# Patient Record
Sex: MALE | Race: BLACK OR AFRICAN AMERICAN | Employment: UNEMPLOYED | URBAN - METROPOLITAN AREA
[De-identification: names, ages, dates, MRNs, and addresses within clinical notes are randomized per-mention and may not be internally consistent; named-entity substitution may affect disease eponyms.]

---

## 2018-01-17 ENCOUNTER — HOSPITAL ENCOUNTER (EMERGENCY)
Age: 38
Discharge: HOME | End: 2018-01-17
Attending: EMERGENCY MEDICINE

## 2018-01-17 VITALS
RESPIRATION RATE: 16 BRPM | HEIGHT: 67 IN | HEART RATE: 77 BPM | OXYGEN SATURATION: 100 % | SYSTOLIC BLOOD PRESSURE: 149 MMHG | DIASTOLIC BLOOD PRESSURE: 93 MMHG | BODY MASS INDEX: 25.11 KG/M2 | TEMPERATURE: 97.2 F | WEIGHT: 160 LBS

## 2018-01-17 DIAGNOSIS — K08.89 PAIN, DENTAL: Primary | ICD-10-CM

## 2018-01-17 PROCEDURE — 99282 EMERGENCY DEPT VISIT SF MDM: CPT

## 2018-01-17 PROCEDURE — 6370000000 HC RX 637 (ALT 250 FOR IP): Performed by: EMERGENCY MEDICINE

## 2018-01-17 RX ORDER — PENICILLIN V POTASSIUM 500 MG/1
500 TABLET ORAL 4 TIMES DAILY
Qty: 28 TABLET | Refills: 0 | Status: SHIPPED | OUTPATIENT
Start: 2018-01-17 | End: 2018-01-24

## 2018-01-17 RX ORDER — TRAMADOL HYDROCHLORIDE 50 MG/1
50 TABLET ORAL ONCE
Status: COMPLETED | OUTPATIENT
Start: 2018-01-17 | End: 2018-01-17

## 2018-01-17 RX ORDER — PENICILLIN V POTASSIUM 250 MG/1
500 TABLET ORAL ONCE
Status: COMPLETED | OUTPATIENT
Start: 2018-01-17 | End: 2018-01-17

## 2018-01-17 RX ADMIN — PENICILLIN V POTASSIUM 500 MG: 250 TABLET ORAL at 08:27

## 2018-01-17 RX ADMIN — TRAMADOL HYDROCHLORIDE 50 MG: 50 TABLET, FILM COATED ORAL at 08:27

## 2018-01-17 ASSESSMENT — ENCOUNTER SYMPTOMS
CONSTIPATION: 0
TROUBLE SWALLOWING: 0
NAUSEA: 0
COUGH: 0
ABDOMINAL DISTENTION: 0
RHINORRHEA: 0
DIARRHEA: 0
SORE THROAT: 0
SHORTNESS OF BREATH: 0
VOMITING: 0
WHEEZING: 0

## 2018-01-17 ASSESSMENT — PAIN DESCRIPTION - LOCATION: LOCATION: TEETH

## 2018-01-17 ASSESSMENT — PAIN DESCRIPTION - PAIN TYPE: TYPE: CHRONIC PAIN

## 2018-01-17 ASSESSMENT — PAIN SCALES - GENERAL: PAINLEVEL_OUTOF10: 10

## 2018-01-17 ASSESSMENT — PAIN DESCRIPTION - FREQUENCY: FREQUENCY: CONTINUOUS

## 2018-01-17 ASSESSMENT — PAIN DESCRIPTION - DESCRIPTORS: DESCRIPTORS: THROBBING

## 2018-01-17 ASSESSMENT — PAIN DESCRIPTION - ORIENTATION: ORIENTATION: LEFT;LOWER

## 2018-01-17 NOTE — ED NOTES
[facility]  [address]  Phone: [phone]  Fax: [phone]    Patient Referral Fax     To:  Patient Referral Coordinator Fax:  [phone]  Referral from:  Opal Sommer [address]  30 y.o.      [phone] (home)      Additional Notes: Friday January 19, 2018 at 9:00am     Signature: Kyra Bose Date: 1/17/18         [facility]  01/17/18 7247

## 2019-12-01 ENCOUNTER — HOSPITAL ENCOUNTER (EMERGENCY)
Age: 39
Discharge: HOME | End: 2019-12-01
Attending: EMERGENCY MEDICINE

## 2019-12-01 DIAGNOSIS — R00.0 TACHYCARDIA: Primary | ICD-10-CM

## 2019-12-01 LAB
ABSOLUTE EOS #: 0.05 K/UL (ref 0–0.44)
ABSOLUTE IMMATURE GRANULOCYTE: 0.05 K/UL (ref 0–0.3)
ABSOLUTE LYMPH #: 1.77 K/UL (ref 1.1–3.7)
ABSOLUTE MONO #: 1.05 K/UL (ref 0.1–1.2)
ANION GAP SERPL CALCULATED.3IONS-SCNC: 15 MMOL/L (ref 9–17)
BASOPHILS # BLD: 0 % (ref 0–2)
BASOPHILS ABSOLUTE: <0.03 K/UL (ref 0–0.2)
BUN BLDV-MCNC: 11 MG/DL (ref 6–20)
BUN/CREAT BLD: NORMAL (ref 9–20)
CALCIUM SERPL-MCNC: 9.4 MG/DL (ref 8.6–10.4)
CHLORIDE BLD-SCNC: 105 MMOL/L (ref 98–107)
CO2: 22 MMOL/L (ref 20–31)
CREAT SERPL-MCNC: 0.89 MG/DL (ref 0.7–1.2)
DIFFERENTIAL TYPE: ABNORMAL
EOSINOPHILS RELATIVE PERCENT: 0 % (ref 1–4)
GFR AFRICAN AMERICAN: >60 ML/MIN
GFR NON-AFRICAN AMERICAN: >60 ML/MIN
GFR SERPL CREATININE-BSD FRML MDRD: NORMAL ML/MIN/{1.73_M2}
GFR SERPL CREATININE-BSD FRML MDRD: NORMAL ML/MIN/{1.73_M2}
GLUCOSE BLD-MCNC: 98 MG/DL (ref 70–99)
HCT VFR BLD CALC: 40.9 % (ref 40.7–50.3)
HEMOGLOBIN: 13 G/DL (ref 13–17)
IMMATURE GRANULOCYTES: 0 %
LYMPHOCYTES # BLD: 13 % (ref 24–43)
MCH RBC QN AUTO: 28.7 PG (ref 25.2–33.5)
MCHC RBC AUTO-ENTMCNC: 31.8 G/DL (ref 28.4–34.8)
MCV RBC AUTO: 90.3 FL (ref 82.6–102.9)
MONOCYTES # BLD: 8 % (ref 3–12)
NRBC AUTOMATED: 0 PER 100 WBC
PDW BLD-RTO: 13.3 % (ref 11.8–14.4)
PLATELET # BLD: 336 K/UL (ref 138–453)
PLATELET ESTIMATE: ABNORMAL
PMV BLD AUTO: 10.5 FL (ref 8.1–13.5)
POTASSIUM SERPL-SCNC: 3.7 MMOL/L (ref 3.7–5.3)
RBC # BLD: 4.53 M/UL (ref 4.21–5.77)
RBC # BLD: ABNORMAL 10*6/UL
SEG NEUTROPHILS: 79 % (ref 36–65)
SEGMENTED NEUTROPHILS ABSOLUTE COUNT: 10.36 K/UL (ref 1.5–8.1)
SODIUM BLD-SCNC: 142 MMOL/L (ref 135–144)
TROPONIN INTERP: NORMAL
TROPONIN T: NORMAL NG/ML
TROPONIN, HIGH SENSITIVITY: 8 NG/L (ref 0–22)
WBC # BLD: 13.3 K/UL (ref 3.5–11.3)
WBC # BLD: ABNORMAL 10*3/UL

## 2019-12-01 PROCEDURE — 6360000002 HC RX W HCPCS: Performed by: STUDENT IN AN ORGANIZED HEALTH CARE EDUCATION/TRAINING PROGRAM

## 2019-12-01 PROCEDURE — 93005 ELECTROCARDIOGRAM TRACING: CPT | Performed by: STUDENT IN AN ORGANIZED HEALTH CARE EDUCATION/TRAINING PROGRAM

## 2019-12-01 PROCEDURE — 2580000003 HC RX 258: Performed by: STUDENT IN AN ORGANIZED HEALTH CARE EDUCATION/TRAINING PROGRAM

## 2019-12-01 PROCEDURE — 85025 COMPLETE CBC W/AUTO DIFF WBC: CPT

## 2019-12-01 PROCEDURE — 80048 BASIC METABOLIC PNL TOTAL CA: CPT

## 2019-12-01 PROCEDURE — 99285 EMERGENCY DEPT VISIT HI MDM: CPT

## 2019-12-01 PROCEDURE — 84484 ASSAY OF TROPONIN QUANT: CPT

## 2019-12-01 PROCEDURE — 96374 THER/PROPH/DIAG INJ IV PUSH: CPT

## 2019-12-01 RX ORDER — LORAZEPAM 2 MG/ML
1 INJECTION INTRAMUSCULAR ONCE
Status: COMPLETED | OUTPATIENT
Start: 2019-12-01 | End: 2019-12-01

## 2019-12-01 RX ORDER — SODIUM CHLORIDE, SODIUM LACTATE, POTASSIUM CHLORIDE, AND CALCIUM CHLORIDE .6; .31; .03; .02 G/100ML; G/100ML; G/100ML; G/100ML
1000 INJECTION, SOLUTION INTRAVENOUS ONCE
Status: COMPLETED | OUTPATIENT
Start: 2019-12-01 | End: 2019-12-01

## 2019-12-01 RX ADMIN — LORAZEPAM 1 MG: 2 INJECTION INTRAMUSCULAR; INTRAVENOUS at 17:11

## 2019-12-01 RX ADMIN — SODIUM CHLORIDE, POTASSIUM CHLORIDE, SODIUM LACTATE AND CALCIUM CHLORIDE 1000 ML: 600; 310; 30; 20 INJECTION, SOLUTION INTRAVENOUS at 17:11

## 2019-12-01 ASSESSMENT — ENCOUNTER SYMPTOMS
DIARRHEA: 0
SORE THROAT: 0
COLOR CHANGE: 0
BACK PAIN: 0
COUGH: 0
WHEEZING: 0
CHEST TIGHTNESS: 1
ABDOMINAL PAIN: 0
NAUSEA: 0
SINUS PAIN: 0
SINUS PRESSURE: 0
VOMITING: 0
SHORTNESS OF BREATH: 1
ABDOMINAL DISTENTION: 0

## 2019-12-02 VITALS
WEIGHT: 165 LBS | OXYGEN SATURATION: 98 % | SYSTOLIC BLOOD PRESSURE: 135 MMHG | RESPIRATION RATE: 15 BRPM | DIASTOLIC BLOOD PRESSURE: 97 MMHG | BODY MASS INDEX: 25.84 KG/M2 | TEMPERATURE: 98.3 F | HEART RATE: 108 BPM

## 2019-12-03 LAB
EKG ATRIAL RATE: 123 BPM
EKG P AXIS: 51 DEGREES
EKG P-R INTERVAL: 148 MS
EKG Q-T INTERVAL: 308 MS
EKG QRS DURATION: 78 MS
EKG QTC CALCULATION (BAZETT): 440 MS
EKG R AXIS: -9 DEGREES
EKG T AXIS: 16 DEGREES
EKG VENTRICULAR RATE: 123 BPM

## 2021-01-24 ENCOUNTER — HOSPITAL ENCOUNTER (EMERGENCY)
Age: 41
Discharge: HOME | End: 2021-01-24
Attending: EMERGENCY MEDICINE

## 2021-01-24 VITALS
HEART RATE: 73 BPM | DIASTOLIC BLOOD PRESSURE: 96 MMHG | OXYGEN SATURATION: 100 % | WEIGHT: 165 LBS | BODY MASS INDEX: 25.9 KG/M2 | RESPIRATION RATE: 20 BRPM | HEIGHT: 67 IN | SYSTOLIC BLOOD PRESSURE: 149 MMHG | TEMPERATURE: 97.2 F

## 2021-01-24 DIAGNOSIS — K64.8 INTERNAL HEMORRHOIDS: Primary | ICD-10-CM

## 2021-01-24 DIAGNOSIS — K62.5 PAINLESS RECTAL BLEEDING: ICD-10-CM

## 2021-01-24 PROCEDURE — 99284 EMERGENCY DEPT VISIT MOD MDM: CPT

## 2021-01-24 RX ORDER — POLYETHYLENE GLYCOL 3350 17 G/17G
17 POWDER, FOR SOLUTION ORAL DAILY PRN
Qty: 527 G | Refills: 1 | Status: SHIPPED | OUTPATIENT
Start: 2021-01-24 | End: 2021-03-27

## 2021-01-24 RX ORDER — PSYLLIUM HUSK/CALCIUM CARB 1 G-60 MG
1 CAPSULE ORAL DAILY
Qty: 120 CAPSULE | Refills: 0 | Status: SHIPPED | OUTPATIENT
Start: 2021-01-24

## 2021-01-24 RX ORDER — POLYETHYLENE GLYCOL 3350 17 G/17G
17 POWDER, FOR SOLUTION ORAL DAILY PRN
Qty: 527 G | Refills: 1 | Status: SHIPPED | OUTPATIENT
Start: 2021-01-24 | End: 2021-01-24 | Stop reason: SDUPTHER

## 2021-01-24 RX ORDER — PSYLLIUM HUSK/CALCIUM CARB 1 G-60 MG
1 CAPSULE ORAL DAILY
Qty: 120 CAPSULE | Refills: 0 | Status: SHIPPED | OUTPATIENT
Start: 2021-01-24 | End: 2021-01-24 | Stop reason: SDUPTHER

## 2021-01-24 ASSESSMENT — ENCOUNTER SYMPTOMS
DIARRHEA: 0
SHORTNESS OF BREATH: 0
NAUSEA: 0
VOMITING: 0
COUGH: 0
CONSTIPATION: 0
ABDOMINAL PAIN: 0

## 2021-01-24 ASSESSMENT — PAIN SCALES - GENERAL: PAINLEVEL_OUTOF10: 0

## 2021-01-24 NOTE — ED NOTES
Rectal exam completed by Dr. Shaggy Donovan. Hemacult positive. Dr. Shaggy Donovan states feeling internal hemorrhoids.       Amina Sanchez RN  01/24/21 7441

## 2021-01-24 NOTE — ED PROVIDER NOTES
[facility] ED  Emergency Department Encounter  Emergency Medicine Resident     Pt Name: Diane Chavez  MRN: [id number]  [place] 1980  Date of evaluation: 1/24/21  PCP:  No primary care provider on file. CHIEF COMPLAINT       Chief Complaint   Patient presents with    Rectal Bleeding       HISTORY OFPRESENT ILLNESS  (Location/Symptom, Timing/Onset, Context/Setting, Quality, Duration, Modifying [place].)      Diane Chavez is a 46 yo male who presents with painless rectal bleeding. Patient states that yesterday he noted some bright red stool blood in his stool as well as bright red blood with wiping. He notes that he has had this happen before but was never evaluated. Denies any pain or itching to his rectum or anus. Denies any abdominal pain, fevers, chills, sweats, chest pain or difficulty breathing. Denies any family history of colon cancer. PAST MEDICAL / SURGICAL / SOCIAL / FAMILY HISTORY      has no past medical history on file. Denies     has no past surgical history on file.  Denies    Social History     Socioeconomic History    Marital status: Single     Spouse name: Not on file    Number of children: Not on file    Years of education: Not on file    Highest education level: Not on file   Occupational History    Not on file   Social Needs    Financial resource strain: Not on file    Food insecurity     Worry: Not on file     Inability: Not on file    Transportation needs     Medical: Not on file     Non-medical: Not on file   Tobacco Use    Smoking status: Light Tobacco Smoker    Smokeless tobacco: Never Used   Substance and Sexual Activity    Alcohol use: No    Drug use: No    Sexual activity: Not on file   Lifestyle    Physical activity     Days per week: Not on file     Minutes per session: Not on file    Stress: Not on file   Relationships    Social connections     Talks on phone: Not on file     Gets together: Not on file     Attends General: He is not in acute distress. Appearance: Normal appearance. He is not ill-appearing, toxic-appearing or diaphoretic. Cardiovascular:      Rate and Rhythm: Normal rate and regular rhythm. Heart sounds: No murmur. No gallop. Pulmonary:      Effort: Pulmonary effort is normal.      Breath sounds: Normal breath sounds. Abdominal:      General: There is no distension. Palpations: Abdomen is soft. Tenderness: There is no abdominal tenderness. There is no guarding. Genitourinary:     Rectum: Guaiac result positive. Comments: No external hemorrhoids or anal fissures appreciated. Small internal hemorrhoids palpated on digital rectal exam.  Musculoskeletal:      Right lower leg: No edema. Left lower leg: No edema. Skin:     General: Skin is warm and dry. Neurological:      Mental Status: He is alert and oriented to person, place, and time. DIFFERENTIAL  DIAGNOSIS     PLAN (LABS / IMAGING / EKG):  No orders of the defined types were placed in this encounter. MEDICATIONS ORDERED:  Orders Placed This Encounter   Medications    DISCONTD: polyethylene glycol (MIRALAX) 17 g packet     Sig: Take 17 g by mouth daily as needed for Constipation     Dispense:  527 g     Refill:  1    DISCONTD: Psyllium-Calcium (METAMUCIL PLUS CALCIUM) CAPS     Sig: Take 1 capsule by mouth daily Take with 8 oz water. Dispense:  120 capsule     Refill:  0    polyethylene glycol (MIRALAX) 17 g packet     Sig: Take 17 g by mouth daily as needed for Constipation     Dispense:  527 g     Refill:  1    Psyllium-Calcium (METAMUCIL PLUS CALCIUM) CAPS     Sig: Take 1 capsule by mouth daily Take with 8 oz water. Dispense:  120 capsule     Refill:  0       DDX: Internal hemorrhoid, external hemorrhoid, anal fissure. Malignancy less likely. Initial MDM/Plan/ED course: 47 y.o. male who presents with painless bright red rectal bleeding.   On exam vitals normal patient is in no acute distress. Physical exam with no abdominal tenderness, abdomen soft. Rectal exam without any external hemorrhoids or fissures however internal hemorrhoids palpated and small. No gross blood however stool is guaiac positive. Will prescribe the patient fiber and MiraLAX and will give the patient follow-up with the [facility] clinic as needed. Also instructed patient to establish PCP in case he needs a colonoscopy although malignancy is less likely at this time as he has no family history of colon cancer and is 47years old. Patient sister spoke with me privately stating that the patient has been using drugs and not acting like himself and wants to speak with social work. Social work consulted for the patient. DIAGNOSTIC RESULTS / EMERGENCY DEPARTMENT COURSE / MDM     LABS:  Labs Reviewed - No data to display      RADIOLOGY:  No results found. EKG      All EKG's are interpreted by the [facility] Physician who either signs or Co-signs this chart in the absence of a cardiologist.      PROCEDURES:  None    CONSULTS:  None    CRITICAL CARE:  Please see attending note    FINAL IMPRESSION      1. Internal hemorrhoids    2.  Painless rectal bleeding          DISPOSITION / PLAN     DISPOSITION Decision To Discharge 01/24/2021 10:55:39 AM      PATIENT REFERRED TO:  JENNIFER WHEAT [facility]  [address] [address] [phone]  Schedule an appointment as soon as possible for a visit   As needed for hemorrhoids    [facility]  [address] [address] [phone]  Schedule an appointment as soon as possible for a visit   For PCP establishment      DISCHARGE MEDICATIONS:  Discharge Medication List as of 1/24/2021 10:59 AM      START taking these medications    Details   polyethylene glycol (MIRALAX) 17 g packet Take 17 g by mouth daily as needed for Constipation, Disp-527 g, R-1Print      Psyllium-Calcium (METAMUCIL PLUS CALCIUM) CAPS Take 1 capsule by mouth daily Take with 8 oz water., Disp-120 capsule, R-0Print             Dejah Madrigal DO  Emergency Medicine Resident    (Please note that portions of this note were completed with a voice recognition program.Efforts were made to edit the dictations but occasionally words are mis-transcribed.)       Margarita Noriega DO  Resident  01/24/21 5892

## 2021-01-24 NOTE — ED TRIAGE NOTES
Patient reports he had some episodes of bright red blood in stools. Patient reports he does strain. Denies pain when having a bowel movement. Patient with family and they want him to be checked.

## 2021-01-24 NOTE — ED NOTES
Writer met with patient's jennyt sister privately per sister's request. Sister stated:  >patient has extensive hx of K2 abuse, stopping approximately 1 year ago  >approximately 1 year ago patient began exhibiting psychiatric symptoms including flat affect, lack of motivation/usual engagement with surroundings, darting of eyes, possible response to internal stimuli, paranoia  >patient now lives with sister  >patient's extremely resistant to seeking professional care, was instructed to come to ED by sister, mother & aunt  >patient does not have insurance or source of income, sister is applying for both on patient's behalf  Writer discussed patient's hx K2 use, current appearance of psychiatric symptoms & possible link between the two. Writer discussed with sister need for comprehensive mental health exam for patient & suggested patient walk into [facility], provided [facility] information. Sister stated intent to get patient to [facility]. While writer was speaking with sister, patient left room.       LIZZIE Vázquez  01/24/21 1141

## 2021-01-24 NOTE — ED PROVIDER NOTES
[facility] ED  eMERGENCY dEPARTMENT eNCOUnter   Attending Attestation     Pt Name: Urbano Moore  MRN: [id number]  [place] 1980  Date of evaluation: 1/24/21       Urbano Moore is a 47 y.o. male who presents with Rectal Bleeding      History: Pt presents with rectal bleeding on toilet paper. . Pt has no other complaints at this time. His sister is concerned that he is doing K2 and other drugs and would like to talk to a . Exam: HRRR, lungs CTABL, abdomen soft and non tender. Pt well appearing. See resident note for rectal exam.     Pt with no gross blood on rectal. Internal hemorrhoids noted. Vitals normal. Recommend high fiber and miralax and return if worse. Vitals normal. No bloodwork at this time needed. I performed a history and physical examination of the patient and discussed management with the resident. I reviewed the residents note and agree with the documented findings and plan of care. Any areas of disagreement are noted on the chart. I was personally present for the key portions of any procedures. I have documented in the chart those procedures where I was not present during the key portions. I have personally reviewed all images and agree with the resident's interpretation. I have reviewed the emergency nurses triage note. I agree with the chief complaint, past medical history, past surgical history, allergies, medications, social and family history as documented unless otherwise noted below. Documentation of the HPI, Physical Exam and Medical Decision Making performed by medical students or scribes is based on my personal performance of the HPI, PE and MDM. For Phys Assistant/ Nurse Practitioner cases/documentation I have had a face to face evaluation of this patient and have completed at least one if not all key elements of the E/M (history, physical exam, and MDM). Additional findings are as noted.     For APC cases I have personally evaluated and examined the patient in conjunction with the APC and agree with the treatment plan and disposition of the patient as recorded by the APC.     Darya Walter MD  Attending Emergency  Physician       Alessandro Billings MD  01/24/21 2705

## 2023-03-16 ENCOUNTER — HOSPITAL ENCOUNTER (EMERGENCY)
Age: 43
Discharge: HOME | End: 2023-03-16
Attending: EMERGENCY MEDICINE

## 2023-03-16 VITALS — OXYGEN SATURATION: 98 % | HEART RATE: 82 BPM | RESPIRATION RATE: 16 BRPM | TEMPERATURE: 97.7 F

## 2023-03-16 DIAGNOSIS — H65.02 ACUTE SEROUS OTITIS MEDIA OF LEFT EAR, RECURRENCE NOT SPECIFIED: Primary | ICD-10-CM

## 2023-03-16 PROCEDURE — 99283 EMERGENCY DEPT VISIT LOW MDM: CPT

## 2023-03-16 PROCEDURE — 6370000000 HC RX 637 (ALT 250 FOR IP): Performed by: STUDENT IN AN ORGANIZED HEALTH CARE EDUCATION/TRAINING PROGRAM

## 2023-03-16 RX ORDER — FLUTICASONE PROPIONATE 50 MCG
1 SPRAY, SUSPENSION (ML) NASAL DAILY
Status: DISCONTINUED | OUTPATIENT
Start: 2023-03-16 | End: 2023-03-16 | Stop reason: HOSPADM

## 2023-03-16 RX ORDER — PSEUDOEPHEDRINE HCL 30 MG
60 TABLET ORAL ONCE
Status: COMPLETED | OUTPATIENT
Start: 2023-03-16 | End: 2023-03-16

## 2023-03-16 RX ORDER — AMOXICILLIN 500 MG/1
500 CAPSULE ORAL 2 TIMES DAILY
Qty: 14 CAPSULE | Refills: 0 | Status: SHIPPED | OUTPATIENT
Start: 2023-03-16 | End: 2023-03-23

## 2023-03-16 RX ADMIN — CARBAMIDE PEROXIDE 6.5% 5 DROP: 6.5 LIQUID AURICULAR (OTIC) at 15:02

## 2023-03-16 RX ADMIN — PSEUDOEPHEDRINE HCL 60 MG: 30 TABLET, FILM COATED ORAL at 14:49

## 2023-03-16 RX ADMIN — FLUTICASONE PROPIONATE 1 SPRAY: 50 SPRAY, METERED NASAL at 14:49

## 2023-03-16 ASSESSMENT — PAIN DESCRIPTION - LOCATION: LOCATION: EAR

## 2023-03-16 ASSESSMENT — PAIN SCALES - GENERAL: PAINLEVEL_OUTOF10: 10

## 2023-03-16 ASSESSMENT — PAIN DESCRIPTION - DESCRIPTORS: DESCRIPTORS: ACHING

## 2023-03-16 NOTE — ED PROVIDER NOTES
[facility]  ED  Emergency Department Encounter  Emergency Medicine Resident     Pt Santi Gutierres  MRN: [id number]  [place] 1980  Date of evaluation: 3/16/23  PCP:  No primary care provider on file. CHIEF COMPLAINT       Chief Complaint   Patient presents with    Otalgia    Nasal Congestion       HISTORY OF PRESENT ILLNESS  (Location/Symptom, Timing/Onset, Context/Setting, Quality, Duration, Modifying Factors, Severity.)      Radha Chiu is a 38 y.o. male who presents with today with 2 different complaints this first complaint is left ear pain second complaint is nasal congestion. He is a very poor historian and does not provide me with any specifics on dates or duration. He seems withdrawn and distant during exam.  He is unable to elaborate any further on his history of present illness. PAST MEDICAL / SURGICAL / SOCIAL / FAMILY HISTORY      has no past medical history on file. has no past surgical history on file. Social History     Socioeconomic History    Marital status: Single     Spouse name: Not on file    Number of children: Not on file    Years of education: Not on file    Highest education level: Not on file   Occupational History    Not on file   Tobacco Use    Smoking status: Light Smoker    Smokeless tobacco: Never   Substance and Sexual Activity    Alcohol use: No    Drug use: No    Sexual activity: Not on file   Other Topics Concern    Not on file   Social History Narrative    Not on file     Social Determinants of Health     Financial Resource Strain: Not on file   Food Insecurity: Not on file   Transportation Needs: Not on file   Physical Activity: Not on file   Stress: Not on file   Social Connections: Not on file   Intimate Partner Violence: Not on file   Housing Stability: Not on file       No family history on file. Allergies:  Patient has no known allergies.     Home Medications:  Prior to Admission medications    Medication Sig Start Date End Date Taking? Authorizing Provider   amoxicillin (AMOXIL) 500 MG capsule Take 1 capsule by mouth 2 times daily for 7 days 3/16/23 3/23/23 Yes Shaquille Rodríguez MD   Psyllium-Calcium (METAMUCIL PLUS CALCIUM) CAPS Take 1 capsule by mouth daily Take with 8 oz water. 1/24/21   Priti Cheek, DO         REVIEW OF SYSTEMS       Review of Systems  See HPI  PHYSICAL EXAM      INITIAL VITALS:   Pulse 82   Temp 97.7 °F (36.5 °C) (Oral)   Resp 16   SpO2 98%     Physical Exam  Constitutional:       Appearance: Normal appearance. HENT:      Right Ear: There is impacted cerumen. Left Ear: No drainage. No mastoid tenderness. Tympanic membrane is injected. Tympanic membrane is not bulging. Cardiovascular:      Rate and Rhythm: Normal rate and regular rhythm. Pulmonary:      Effort: Pulmonary effort is normal. No respiratory distress. Breath sounds: Normal breath sounds. No rales. Neurological:      Mental Status: He is alert. DDX/DIAGNOSTIC RESULTS / EMERGENCY DEPARTMENT COURSE / MDM     Medical Decision Making  Gumaro Roldan is a 38 y.o. male who presents with today with 2 different complaints this first complaint is left ear pain second complaint is nasal congestion. He is a very poor historian and does not provide me with any specifics on dates or duration. He seems withdrawn and distant during exam.  He is unable to elaborate any further on his history of present illness. Patient does appear to have an injected left TM with serous fluid collection behind the TM. No mastoid tenderness, right ear exam did show impacted cerumen. Both treated appropriately. As far as nasal congestion the patient was started on Sudafed and provided Flonase. We discharged home to follow-up with primary care for reevaluation in 48 to 72 hours. Risk  OTC drugs. Prescription drug management. Diagnosis or treatment significantly limited by social determinants of health.     Critical Care  Total time providing critical care: < 30 minutes        Procedures    CONSULTS:  None        FINAL IMPRESSION      1.  Acute serous otitis media of left ear, recurrence not specified          DISPOSITION / PLAN     DISPOSITION Decision To Discharge 03/16/2023 02:48:45 PM      PATIENT REFERRED TO:  [address] [place] [address] [phone]  Schedule an appointment as soon as possible for a visit today        DISCHARGE MEDICATIONS:  New Prescriptions    AMOXICILLIN (AMOXIL) 500 MG CAPSULE    Take 1 capsule by mouth 2 times daily for 7 days       Shahid Lan MD  Emergency Medicine Resident    (Please note that portions of thisnote were completed with a voice recognition program.  Efforts were made to edit the dictations but occasionally words are mis-transcribed.)       Shahid Lan MD  Resident  03/16/23 035

## 2023-03-16 NOTE — ED PROVIDER NOTES
[facility]     Emergency Department     Faculty Attestation    I performed a history and physical examination of the patient and discussed management with the resident. I reviewed the resident´s note and agree with the documented findings and plan of care. Any areas of disagreement are noted on the chart. I was personally present for the key portions of any procedures. I have documented in the chart those procedures where I was not present during the key portions. I have reviewed the emergency nurses triage note. I agree with the chief complaint, past medical history, past surgical history, allergies, medications, social and family history as documented unless otherwise noted below. For Physician Assistant/ Nurse Practitioner cases/documentation I have personally evaluated this patient and have completed at least one if not all key elements of the E/M (history, physical exam, and MDM). Additional findings are as noted. Chest clear, heart exam normal, per resident exam right ear canal blocked by cerumen, left TM erythematous with small effusion.      Keegan Summers MD  03/16/23 8519

## 2023-03-16 NOTE — ED NOTES
Pt to ER for c/o L ear pain and nasal congestion. Pt states that this has been ongoing for a couple days now. Pt denies being around anyone sick. Pt denies taking anything for the pain. No acute distress noted, RR even and NL. Dr. Jennifer Stockton at bedside to evaluate pt.       Jessica Rodríguez RN  03/16/23 1946

## 2024-04-15 ENCOUNTER — HOSPITAL ENCOUNTER (EMERGENCY)
Age: 44
Discharge: HOME | End: 2024-04-15
Attending: EMERGENCY MEDICINE

## 2024-04-15 ENCOUNTER — APPOINTMENT (OUTPATIENT)
Dept: GENERAL RADIOLOGY | Age: 44
End: 2024-04-15

## 2024-04-15 VITALS
HEART RATE: 96 BPM | DIASTOLIC BLOOD PRESSURE: 84 MMHG | SYSTOLIC BLOOD PRESSURE: 126 MMHG | WEIGHT: 170 LBS | TEMPERATURE: 98.2 F | OXYGEN SATURATION: 99 % | RESPIRATION RATE: 14 BRPM | BODY MASS INDEX: 26.68 KG/M2 | HEIGHT: 67 IN

## 2024-04-15 DIAGNOSIS — S82.831A CLOSED FRACTURE OF DISTAL END OF RIGHT FIBULA, UNSPECIFIED FRACTURE MORPHOLOGY, INITIAL ENCOUNTER: Primary | ICD-10-CM

## 2024-04-15 PROCEDURE — 73610 X-RAY EXAM OF ANKLE: CPT

## 2024-04-15 PROCEDURE — 6360000002 HC RX W HCPCS: Performed by: STUDENT IN AN ORGANIZED HEALTH CARE EDUCATION/TRAINING PROGRAM

## 2024-04-15 PROCEDURE — 96372 THER/PROPH/DIAG INJ SC/IM: CPT

## 2024-04-15 PROCEDURE — 99284 EMERGENCY DEPT VISIT MOD MDM: CPT

## 2024-04-15 PROCEDURE — 73630 X-RAY EXAM OF FOOT: CPT

## 2024-04-15 RX ORDER — NAPROXEN 500 MG/1
500 TABLET ORAL
Qty: 15 TABLET | Refills: 0 | Status: SHIPPED | OUTPATIENT
Start: 2024-04-15 | End: 2024-04-20

## 2024-04-15 RX ORDER — KETOROLAC TROMETHAMINE 30 MG/ML
30 INJECTION, SOLUTION INTRAMUSCULAR; INTRAVENOUS ONCE
Status: COMPLETED | OUTPATIENT
Start: 2024-04-15 | End: 2024-04-15

## 2024-04-15 RX ADMIN — KETOROLAC TROMETHAMINE 30 MG: 30 INJECTION, SOLUTION INTRAMUSCULAR; INTRAVENOUS at 02:29

## 2024-04-15 ASSESSMENT — ENCOUNTER SYMPTOMS
ABDOMINAL PAIN: 0
VOMITING: 0
SHORTNESS OF BREATH: 0
WHEEZING: 0
BACK PAIN: 0
NAUSEA: 0
SORE THROAT: 0
DIARRHEA: 0
COUGH: 0

## 2024-04-15 ASSESSMENT — PAIN - FUNCTIONAL ASSESSMENT
PAIN_FUNCTIONAL_ASSESSMENT: 0-10
PAIN_FUNCTIONAL_ASSESSMENT: 0-10
PAIN_FUNCTIONAL_ASSESSMENT: ACTIVITIES ARE NOT PREVENTED

## 2024-04-15 ASSESSMENT — PAIN DESCRIPTION - DESCRIPTORS: DESCRIPTORS: ACHING

## 2024-04-15 ASSESSMENT — PAIN DESCRIPTION - ORIENTATION: ORIENTATION: RIGHT

## 2024-04-15 ASSESSMENT — PAIN SCALES - GENERAL
PAINLEVEL_OUTOF10: 9
PAINLEVEL_OUTOF10: 10

## 2024-04-15 ASSESSMENT — PAIN DESCRIPTION - PAIN TYPE: TYPE: ACUTE PAIN

## 2024-04-15 ASSESSMENT — PAIN DESCRIPTION - LOCATION: LOCATION: ANKLE

## 2024-04-15 NOTE — DISCHARGE INSTRUCTIONS
You are seen in emergency department following a fall.  X-ray shows that you have a fracture of a bone called the fibula which is on the outside of the leg.  You are placed in a walking boot.  You are given crutches for ambulation.  Above is a number for a podiatry which is a foot and ankle doctor.  Please call them to set up an appointment for follow-up.  Please return the emergency department for any new or worsening symptoms

## 2024-04-15 NOTE — ED PROVIDER NOTES
[facility]   Emergency Department  Faculty Attestation       I performed a history and physical examination of the patient and discussed management with the resident. I reviewed the resident’s note and agree with the documented findings including all diagnostic interpretations and plan of care. Any areas of disagreement are noted on the chart. I was personally present for the key portions of any procedures. I have documented in the chart those procedures where I was not present during the key portions. I have reviewed the emergency nurses triage note. I agree with the chief complaint, past medical history, past surgical history, allergies, medications, social and family history as documented unless otherwise noted below.  For Physician Assistant/ Nurse Practitioner cases/documentation I have personally evaluated this patient and have completed at least one if not all key elements of the E/M (history, physical exam, and MDM). Additional findings are as noted.    Patient Name: Andreea Fitch  MRN: [id number]  : 1980  Primary Care Physician: No primary care provider on file.    Date of evaluationa: 4/15/2024   Note Started: 2:20 AM EDT    Pertinent Comments     Chief Complaint:   Chief Complaint   Patient presents with    Ankle Pain     Fell yesterday, twisted ankle, able to ambulate        Initial vitals: (If not listed, please see nursing documentation)  ED Triage Vitals [04/15/24 0138]   BP Temp Temp Source Pulse Respirations SpO2 Height Weight - Scale   126/84 98.2 °F (36.8 °C) Oral 96 14 99 % 1.702 m (5' 7\") 77.1 kg (170 lb)        HPI/PE/Impression:  This is a 43 y.o. male who presents to the Emergency Department w/ ankle pain after rolling it yesterday.  On exam he has tendernss over the right ankle bilaterally but worse over the lateral malleolus.  Decreased ROM due to pain. Normal pulses and cap refill < 2 s. Sesnation intact.     Medical Decision Making  X-rays show fibular

## 2024-04-15 NOTE — ED TRIAGE NOTES
Pt ambulatory to ED by self.  Pt is a/o x4.  Pt stated that he was walking yesterday and fell, twisting his R ankle.  Pt is able to ambulate.  Pt vitals assessed and noted.  NAD noted.  Pt denies any other complaints or concerns.

## 2024-04-15 NOTE — ED PROVIDER NOTES
[facility] ED  Emergency Department Encounter  Emergency Medicine Resident     Pt Name:Andreea Fitch  MRN: [id number]  Birthdate 1980  Date of evaluation: 4/15/24  PCP:  No primary care provider on file.  Note Started: 2:22 AM EDT      CHIEF COMPLAINT       Chief Complaint   Patient presents with    Ankle Pain     Fell yesterday, twisted ankle, able to ambulate       HISTORY OF PRESENT ILLNESS  (Location/Symptom, Timing/Onset, Context/Setting, Quality, Duration, Modifying Factors, Severity.)      Andreea Fitch is a 43 y.o. male who presents with right ankle pain.  Patient states that he was walking last evening when he stepped in a hole and fell to the ground.  States that he twisted his right ankle.  Has been able to ambulate but has been having increased pain in the area.  Denies any other injuries in the incident.  Did not his head lose consciousness.  No other complaints at this time.    PAST MEDICAL / SURGICAL / SOCIAL / FAMILY HISTORY      has no past medical history on file.       has no past surgical history on file.      Social History     Socioeconomic History    Marital status: Single     Spouse name: Not on file    Number of children: Not on file    Years of education: Not on file    Highest education level: Not on file   Occupational History    Not on file   Tobacco Use    Smoking status: Light Smoker    Smokeless tobacco: Never   Substance and Sexual Activity    Alcohol use: No    Drug use: No    Sexual activity: Not on file   Other Topics Concern    Not on file   Social History Narrative    Not on file     Social Determinants of Health     Financial Resource Strain: Not on file   Food Insecurity: Not on file   Transportation Needs: Not on file   Physical Activity: Not on file   Stress: Not on file   Social Connections: Not on file   Intimate Partner Violence: Not on file   Housing Stability: Not on file       History reviewed. No pertinent family history.    Allergies:  Patient

## 2025-08-26 ENCOUNTER — HOSPITAL ENCOUNTER (EMERGENCY)
Age: 45
Discharge: HOME | End: 2025-08-26
Attending: EMERGENCY MEDICINE

## 2025-08-26 VITALS
HEIGHT: 67 IN | WEIGHT: 180 LBS | DIASTOLIC BLOOD PRESSURE: 64 MMHG | SYSTOLIC BLOOD PRESSURE: 114 MMHG | RESPIRATION RATE: 16 BRPM | TEMPERATURE: 97.7 F | HEART RATE: 76 BPM | OXYGEN SATURATION: 99 % | BODY MASS INDEX: 28.25 KG/M2

## 2025-08-26 DIAGNOSIS — Z00.8 MEDICAL CLEARANCE FOR INCARCERATION: Primary | ICD-10-CM

## 2025-08-26 PROCEDURE — 93005 ELECTROCARDIOGRAM TRACING: CPT | Performed by: EMERGENCY MEDICINE

## 2025-08-26 PROCEDURE — 99283 EMERGENCY DEPT VISIT LOW MDM: CPT

## 2025-08-26 ASSESSMENT — PAIN - FUNCTIONAL ASSESSMENT: PAIN_FUNCTIONAL_ASSESSMENT: 0-10

## 2025-08-26 ASSESSMENT — PAIN SCALES - GENERAL: PAINLEVEL_OUTOF10: 0

## 2025-08-27 LAB
EKG ATRIAL RATE: 75 BPM
EKG P AXIS: 68 DEGREES
EKG P-R INTERVAL: 152 MS
EKG Q-T INTERVAL: 364 MS
EKG QRS DURATION: 76 MS
EKG QTC CALCULATION (BAZETT): 406 MS
EKG R AXIS: 27 DEGREES
EKG T AXIS: 20 DEGREES
EKG VENTRICULAR RATE: 75 BPM

## 2025-08-27 PROCEDURE — 93010 ELECTROCARDIOGRAM REPORT: CPT | Performed by: INTERNAL MEDICINE
